# Patient Record
(demographics unavailable — no encounter records)

---

## 2025-06-18 NOTE — CONSULT LETTER
[Dear  ___] : Dear  [unfilled], [Consult Letter:] : I had the pleasure of evaluating your patient, [unfilled]. [Please see my note below.] : Please see my note below. [Consult Closing:] : Thank you very much for allowing me to participate in the care of this patient.  If you have any questions, please do not hesitate to contact me. [Sincerely,] : Sincerely, [FreeTextEntry2] : Dr. Cassandra Petersen  [address] [place] [id number] [FreeTextEntry3] : Susana Gomez MD   Pediatric Otolaryngology/ Head & Neck Surgery [facility] , Otolaryngology; [facility]  [facility] [place] [address] Tel [phone] Fax [phone]

## 2025-06-18 NOTE — DATA REVIEWED
[FreeTextEntry1] : An audiogram was performed today to evaluate eustachian tube status and hearing status and the results were reviewed and reveal: Tymps: AD type A tympanogram, AS type As tympanogram Soundfield/Thresholds: CNT

## 2025-06-18 NOTE — PHYSICAL EXAM
[Clear to Auscultation] : lungs were clear to auscultation bilaterally [Normal Gait and Station] : normal gait and station [Normal muscle strength, symmetry and tone of facial, head and neck musculature] : normal muscle strength, symmetry and tone of facial, head and neck musculature [Normal] : no cervical lymphadenopathy [Exposed Vessel] : left anterior vessel not exposed [Wheezing] : no wheezing [Increased Work of Breathing] : no increased work of breathing with use of accessory muscles and retractions [de-identified] : thick lingual band

## 2025-06-18 NOTE — HISTORY OF PRESENT ILLNESS
[de-identified] : This child presents with a history of a speech delay. poor tongue movement with icecream Evaluated by EI and diagnosed with expressive speech delay Speech has improved in the past few weeks  They have around 50 words and has started to put 2 words together to form sentences  Needs audiogram but NO recurrent ear infections.  Is NOT in speech therapy because they did not qualify   There IS NO global developmental delay/hypotonia concern.  There is no history of snoring, mouth breathing or witnessed apnea. No throat/tonsil infections. No problems with swallowing or with VPI/nasal regurgitation.  Passed NBHT AU. Full term,  uncomplicated delivery with uncomplicated pregnancy. No cyanosis, no ETT intubation, no home oxygen requirement, no NICU stay

## 2025-06-18 NOTE — ASSESSMENT
[FreeTextEntry1] : This child presents with an unreliable audiogram and possible speech delay/hearing loss.   I discussed the option of a sedated ABR (auditory brainstem response hearing testing) and ear exam with ear tube placement if there is fluid. I also discussed the option of expectant management (watchful waiting and prn antibiotics I have explained the risks of myringotomy and tube including but not limited to general anesthesia, bleeding, infection, persistent symptoms, retained ear tube, otorrhea, tympanic membrane perforation, and possible need for further procedures.   The family would like to proceed with an ABR and BMT if fluid at time of ABR post wax removal. The patient will get floxin otic if tubes are placed. (5 drops bid for 3 days then as needed for otorrhea/infection).  This child presents with tongue tie and tongue movement concerns/ speech articulation problems. We will plan for frenulectomy. Risks, benefits and alternatives were discussed with the family regarding frenulectomy. I explained the risks, including but not limited to, bleeding, infection, recurrence, anesthesia risks,  and need for further surgery and the possibility that surgery will not help and need for speech therapy. RELEASE WILL NOT HELP WITH SPEECH DELAY.   Eval Speech Therapy but given improvement they may hold off on genetics/dev peds unless no sig improvmenet. For now they will keep out for tongue movement and reeval if concerns. Repeat audio with [facility] and if DNT can book abr with them.